# Patient Record
Sex: FEMALE
[De-identification: names, ages, dates, MRNs, and addresses within clinical notes are randomized per-mention and may not be internally consistent; named-entity substitution may affect disease eponyms.]

---

## 2022-11-09 ENCOUNTER — NURSE TRIAGE (OUTPATIENT)
Dept: OTHER | Facility: CLINIC | Age: 32
End: 2022-11-09

## 2022-11-09 NOTE — TELEPHONE ENCOUNTER
Location of patient: [place]    Subjective: Caller states \"I've had sudden onset of severe pain in my R ear. I think my L ear is starting to feel funny. I'm 33 weeks pregnant. I'm hearing crackles\"     Current Symptoms: a lot of congestion; I also have a baby that is positive for RSV and I've gotten sick since her diagnosis    Pain Severity:   8 or 9 out of 10    Temperature: no fever     What has been tried: tylenol    Recommended disposition: Pt reports she is already on her way to a [facility]. Triage RN encouraged pt to seek care, between the severe ear pain, being pregnant, and having a child with RSV, it is important to get care. If the [facility] does not seem to be  what you want to do, please come to the ED for care. Care advice provided, patient verbalizes understanding; denies any other questions or concerns. Outcome: Patient/caller agrees to proceed to nearest [facility] or come to the ED if the [facility] appears to be very busy.      Reason for Disposition   [1] SEVERE pain AND [2] not improved 2 hours after taking analgesic medication (e.g., ibuprofen or acetaminophen)    Protocols used: Earache-ADULT-AH